# Patient Record
(demographics unavailable — no encounter records)

---

## 2024-10-23 NOTE — PAST MEDICAL HISTORY
[Menstruating] : menstruating [Menarche Age ____] : age at menarche was [unfilled] [Definite ___ (Date)] : the last menstrual period was [unfilled] [Normal Amount/Duration] : it was of a normal amount and duration [Regular Cycle Intervals] : have been regular [Total Preg ___] : G[unfilled] [Live Births ___] : P[unfilled]  [AB Spont ___] : miscarriages: [unfilled]

## 2024-10-24 NOTE — REVIEW OF SYSTEMS
[Wheezing] : wheezing [Negative] : Heme/Lymph [Fever] : no fever [Chills] : no chills [Fatigue] : no fatigue [Recent Change In Weight] : ~T no recent weight change [Chest Pain] : no chest pain [Palpitations] : no palpitations [Lower Ext Edema] : no lower extremity edema [Shortness Of Breath] : no shortness of breath [Cough] : no cough [Dyspnea on Exertion] : no dyspnea on exertion [Abdominal Pain] : no abdominal pain [Nausea] : no nausea [Constipation] : no constipation [Diarrhea] : diarrhea [Vomiting] : no vomiting [Heartburn] : no heartburn [Melena] : no melena [Dysuria] : no dysuria [Incontinence] : no incontinence [Nocturia] : no nocturia [Hematuria] : no hematuria [Joint Pain] : no joint pain [Joint Stiffness] : no joint stiffness [Joint Swelling] : no joint swelling [Muscle Pain] : no muscle pain [Back Pain] : no back pain [Itching] : no itching [Mole Changes] : no mole changes [Skin Rash] : no skin rash [Headache] : no headache [Dizziness] : no dizziness [Fainting] : no fainting [Unsteady Walking] : no ataxia [Insomnia] : no insomnia [Anxiety] : no anxiety [Depression] : no depression [Easy Bleeding] : no easy bleeding [Easy Bruising] : no easy bruising [Swollen Glands] : no swollen glands [FreeTextEntry3] : Wears corrective lenses, [FreeTextEntry9] : occ knee pain,

## 2024-10-24 NOTE — HISTORY OF PRESENT ILLNESS
[FreeTextEntry1] : Patient presented for the first time for transition of care, she's looking for a new PCP and requesting a PE.  Last PE was 2 years ago. [de-identified] : She feels well w/o any new complaint.  She has not been sick over the last couple of years.  She had COVID infection 2x in 2021. She recovered on her own.  She had 2 doses of COVID vaccine.  She declined Flu vaccine.

## 2024-10-24 NOTE — ASSESSMENT
[FreeTextEntry1] : Pt will f/u with GYN for Pap smear and breast imaging studies.  She was reminded to have routine eye exam and dental care.  She was given Rx to check routine labs.

## 2024-10-24 NOTE — HEALTH RISK ASSESSMENT
[Excellent] : ~his/her~  mood as  excellent [Yes] : Yes [Monthly or less (1 pt)] : Monthly or less (1 point) [1 or 2 (0 pts)] : 1 or 2 (0 points) [Never (0 pts)] : Never (0 points) [No] : In the past 12 months have you used drugs other than those required for medical reasons? No [No falls in past year] : Patient reported no falls in the past year [Little interest or pleasure doing things] : 1) Little interest or pleasure doing things [Feeling down, depressed, or hopeless] : 2) Feeling down, depressed, or hopeless [0] : 2) Feeling down, depressed, or hopeless: Not at all (0) [PHQ-2 Negative - No further assessment needed] : PHQ-2 Negative - No further assessment needed [None] : None [With Family] : lives with family [# of Members in Household ___] :  household currently consist of [unfilled] member(s) [Employed] : employed [College] : College [] :  [# Of Children ___] : has [unfilled] children [Feels Safe at Home] : Feels safe at home [Fully functional (bathing, dressing, toileting, transferring, walking, feeding)] : Fully functional (bathing, dressing, toileting, transferring, walking, feeding) [Fully functional (using the telephone, shopping, preparing meals, housekeeping, doing laundry, using] : Fully functional and needs no help or supervision to perform IADLs (using the telephone, shopping, preparing meals, housekeeping, doing laundry, using transportation, managing medications and managing finances) [Smoke Detector] : smoke detector [Seat Belt] :  uses seat belt [Never] : Never [NO] : No [Audit-CScore] : 0 [de-identified] : A lot of walking at work,  [[id number]] : 0 [EyeExamDate] : 09/24 [Change in mental status noted] : No change in mental status noted [Reports changes in hearing] : Reports no changes in hearing [Reports changes in vision] : Reports no changes in vision [Reports changes in dental health] : Reports no changes in dental health [MammogramDate] : 06/22 [MammogramComments] : US breast - 6/2022 [PapSmearDate] : 04/22 [de-identified] : dentist - 2023

## 2024-10-24 NOTE — DATA REVIEWED
[FreeTextEntry1] : EKG - NSR, R - 64, axis - 60, no STTW abnormality.  Prior EKG not available for comparison.

## 2024-10-24 NOTE — HEALTH RISK ASSESSMENT
[Excellent] : ~his/her~  mood as  excellent [Yes] : Yes [Monthly or less (1 pt)] : Monthly or less (1 point) [1 or 2 (0 pts)] : 1 or 2 (0 points) [Never (0 pts)] : Never (0 points) [No] : In the past 12 months have you used drugs other than those required for medical reasons? No [No falls in past year] : Patient reported no falls in the past year [Little interest or pleasure doing things] : 1) Little interest or pleasure doing things [Feeling down, depressed, or hopeless] : 2) Feeling down, depressed, or hopeless [0] : 2) Feeling down, depressed, or hopeless: Not at all (0) [PHQ-2 Negative - No further assessment needed] : PHQ-2 Negative - No further assessment needed [None] : None [With Family] : lives with family [# of Members in Household ___] :  household currently consist of [unfilled] member(s) [Employed] : employed [College] : College [] :  [# Of Children ___] : has [unfilled] children [Feels Safe at Home] : Feels safe at home [Fully functional (bathing, dressing, toileting, transferring, walking, feeding)] : Fully functional (bathing, dressing, toileting, transferring, walking, feeding) [Fully functional (using the telephone, shopping, preparing meals, housekeeping, doing laundry, using] : Fully functional and needs no help or supervision to perform IADLs (using the telephone, shopping, preparing meals, housekeeping, doing laundry, using transportation, managing medications and managing finances) [Smoke Detector] : smoke detector [Seat Belt] :  uses seat belt [Never] : Never [NO] : No [Audit-CScore] : 0 [de-identified] : A lot of walking at work,  [[id number]] : 0 [EyeExamDate] : 09/24 [Change in mental status noted] : No change in mental status noted [Reports changes in hearing] : Reports no changes in hearing [Reports changes in vision] : Reports no changes in vision [Reports changes in dental health] : Reports no changes in dental health [MammogramDate] : 06/22 [MammogramComments] : US breast - 6/2022 [PapSmearDate] : 04/22 [de-identified] : dentist - 2023

## 2024-10-24 NOTE — HISTORY OF PRESENT ILLNESS
[FreeTextEntry1] : Patient presented for the first time for transition of care, she's looking for a new PCP and requesting a PE.  Last PE was 2 years ago. [de-identified] : She feels well w/o any new complaint.  She has not been sick over the last couple of years.  She had COVID infection 2x in 2021. She recovered on her own.  She had 2 doses of COVID vaccine.  She declined Flu vaccine.

## 2024-10-24 NOTE — PHYSICAL EXAM
[No Acute Distress] : no acute distress [Well Nourished] : well nourished [Well Developed] : well developed [Normal Sclera/Conjunctiva] : normal sclera/conjunctiva [PERRL] : pupils equal round and reactive to light [EOMI] : extraocular movements intact [Normal Outer Ear/Nose] : the outer ears and nose were normal in appearance [Normal Oropharynx] : the oropharynx was normal [Normal TMs] : both tympanic membranes were normal [Normal Nasal Mucosa] : the nasal mucosa was normal [No JVD] : no jugular venous distention [No Lymphadenopathy] : no lymphadenopathy [Supple] : supple [No Respiratory Distress] : no respiratory distress  [Clear to Auscultation] : lungs were clear to auscultation bilaterally [Normal Rate] : normal rate  [Regular Rhythm] : with a regular rhythm [Normal S1, S2] : normal S1 and S2 [No Carotid Bruits] : no carotid bruits [Pedal Pulses Present] : the pedal pulses are present [No Edema] : there was no peripheral edema [No Extremity Clubbing/Cyanosis] : no extremity clubbing/cyanosis [Soft] : abdomen soft [Non Tender] : non-tender [Non-distended] : non-distended [Normal Bowel Sounds] : normal bowel sounds [Declined Rectal Exam] : declined rectal exam [Normal Supraclavicular Nodes] : no supraclavicular lymphadenopathy [Normal Axillary Nodes] : no axillary lymphadenopathy [Normal Posterior Cervical Nodes] : no posterior cervical lymphadenopathy [Normal Anterior Cervical Nodes] : no anterior cervical lymphadenopathy [No CVA Tenderness] : no CVA  tenderness [No Spinal Tenderness] : no spinal tenderness [No Joint Swelling] : no joint swelling [Grossly Normal Strength/Tone] : grossly normal strength/tone [No Rash] : no rash [Coordination Grossly Intact] : coordination grossly intact [No Focal Deficits] : no focal deficits [Normal Gait] : normal gait [Speech Grossly Normal] : speech grossly normal [Normal Affect] : the affect was normal [Alert and Oriented x3] : oriented to person, place, and time [Normal Mood] : the mood was normal [de-identified] : Obese female in stated age,  [FreeTextEntry1] : declined, pt is menstruating,

## 2024-10-24 NOTE — PHYSICAL EXAM
[No Acute Distress] : no acute distress [Well Nourished] : well nourished [Well Developed] : well developed [Normal Sclera/Conjunctiva] : normal sclera/conjunctiva [PERRL] : pupils equal round and reactive to light [EOMI] : extraocular movements intact [Normal Outer Ear/Nose] : the outer ears and nose were normal in appearance [Normal Oropharynx] : the oropharynx was normal [Normal TMs] : both tympanic membranes were normal [Normal Nasal Mucosa] : the nasal mucosa was normal [No JVD] : no jugular venous distention [No Lymphadenopathy] : no lymphadenopathy [Supple] : supple [No Respiratory Distress] : no respiratory distress  [Clear to Auscultation] : lungs were clear to auscultation bilaterally [Normal Rate] : normal rate  [Regular Rhythm] : with a regular rhythm [Normal S1, S2] : normal S1 and S2 [No Carotid Bruits] : no carotid bruits [Pedal Pulses Present] : the pedal pulses are present [No Edema] : there was no peripheral edema [No Extremity Clubbing/Cyanosis] : no extremity clubbing/cyanosis [Soft] : abdomen soft [Non Tender] : non-tender [Non-distended] : non-distended [Normal Bowel Sounds] : normal bowel sounds [Declined Rectal Exam] : declined rectal exam [Normal Supraclavicular Nodes] : no supraclavicular lymphadenopathy [Normal Axillary Nodes] : no axillary lymphadenopathy [Normal Posterior Cervical Nodes] : no posterior cervical lymphadenopathy [Normal Anterior Cervical Nodes] : no anterior cervical lymphadenopathy [No CVA Tenderness] : no CVA  tenderness [No Spinal Tenderness] : no spinal tenderness [No Joint Swelling] : no joint swelling [Grossly Normal Strength/Tone] : grossly normal strength/tone [No Rash] : no rash [Coordination Grossly Intact] : coordination grossly intact [No Focal Deficits] : no focal deficits [Normal Gait] : normal gait [Speech Grossly Normal] : speech grossly normal [Normal Affect] : the affect was normal [Alert and Oriented x3] : oriented to person, place, and time [Normal Mood] : the mood was normal [de-identified] : Obese female in stated age,  [FreeTextEntry1] : declined, pt is menstruating,

## 2025-04-17 NOTE — HISTORY OF PRESENT ILLNESS
[de-identified] : ROCKY is a 45 year old F who presents for initial visit for R knee pain onset 1 week ago (3/31/25) with acute worsening in last 2-3 days without injury or trauma. Pain is primarily located at the lateral knee and described as dull in nature 3/10 in intensity, worse with knee flexion/kneeling, better with rest/warm compress.  Patient does report some swelling at symptom onset with improving since onset. Also reports + locking/buckling of R knee w/o falls. Reports prior meniscal tear ~10 years ago and did not do any PT, surgery, or take any meds at that time with spontaneous pain resolution and has been pain-free since then until current presentation. Reports pain feels similar this time to prior injury and reports having an MRI 10 years ago and was told she had a torn meniscus at that time. Patient has not seen another physician for this episode and no new imaging.

## 2025-04-17 NOTE — HISTORY OF PRESENT ILLNESS
[de-identified] : ROCKY is a 45 year old F who presents for initial visit for R knee pain onset 1 week ago (3/31/25) with acute worsening in last 2-3 days without injury or trauma. Pain is primarily located at the lateral knee and described as dull in nature 3/10 in intensity, worse with knee flexion/kneeling, better with rest/warm compress.  Patient does report some swelling at symptom onset with improving since onset. Also reports + locking/buckling of R knee w/o falls. Reports prior meniscal tear ~10 years ago and did not do any PT, surgery, or take any meds at that time with spontaneous pain resolution and has been pain-free since then until current presentation. Reports pain feels similar this time to prior injury and reports having an MRI 10 years ago and was told she had a torn meniscus at that time. Patient has not seen another physician for this episode and no new imaging.

## 2025-04-17 NOTE — HISTORY OF PRESENT ILLNESS
[de-identified] : ROCKY is a 45 year old F who presents for initial visit for R knee pain onset 1 week ago (3/31/25) with acute worsening in last 2-3 days without injury or trauma. Pain is primarily located at the lateral knee and described as dull in nature 3/10 in intensity, worse with knee flexion/kneeling, better with rest/warm compress.  Patient does report some swelling at symptom onset with improving since onset. Also reports + locking/buckling of R knee w/o falls. Reports prior meniscal tear ~10 years ago and did not do any PT, surgery, or take any meds at that time with spontaneous pain resolution and has been pain-free since then until current presentation. Reports pain feels similar this time to prior injury and reports having an MRI 10 years ago and was told she had a torn meniscus at that time. Patient has not seen another physician for this episode and no new imaging.

## 2025-04-17 NOTE — END OF VISIT
[] : Resident [FreeTextEntry3] : Gisela Bender MD, [facility] Department of Orthopedics

## 2025-04-17 NOTE — PHYSICAL EXAM
[de-identified] : Constitutional: Well-nourished, well-developed, No acute distress Respiratory: Good respiratory effort, no SOB Lymphatic: No regional lymphadenopathy, no lymphedema Psychiatric: Pleasant and normal affect, alert and oriented x3 Skin: Clean dry and intact B/L UE/LE Musculoskeletal: normal except where as noted in regional exam   R Knee: APPEARANCE: Mild swelling of anterior inferolateral and superolateral R knee. POSITIVE TENDERNESS: + MIld TTP along lateral femoral epicondyle and patella. Knee ROM: full & painless. RESISTIVE TESTING: painless resisted knee flex/ext with 5/5 strength.  SPECIAL TESTS: stable v/v stress. + mild pain elicited with passive movement of patella medially and laterally with knee extended position at rest.  Lachman's did not reproduce pain and an endpoint was appreciated. Randy's did not reproduce pain. Thessaly test with mild reproduction of pain at medial knee.  NEURO: Normal sensation of LEs, DTRs 2+/4 in patella and achilles bl B/L Hips: No asymmetry, malalignment, or swelling, Full ROM, 5/5 strength in flexion B/L Ankles: No asymmetry, malalignment, or swelling, Full ROM, 5/5 strength in DF/PF   [de-identified] : The following radiographs were ordered and read by me during this patient's visit. I reviewed each radiograph in detail with the patient and discussed the findings as highlighted below.  4 view XR of the R knee obtained today that shows Medial compartment joint space narrowing.

## 2025-04-17 NOTE — PHYSICAL EXAM
[de-identified] : Constitutional: Well-nourished, well-developed, No acute distress Respiratory: Good respiratory effort, no SOB Lymphatic: No regional lymphadenopathy, no lymphedema Psychiatric: Pleasant and normal affect, alert and oriented x3 Skin: Clean dry and intact B/L UE/LE Musculoskeletal: normal except where as noted in regional exam   R Knee: APPEARANCE: Mild swelling of anterior inferolateral and superolateral R knee. POSITIVE TENDERNESS: + MIld TTP along lateral femoral epicondyle and patella. Knee ROM: full & painless. RESISTIVE TESTING: painless resisted knee flex/ext with 5/5 strength.  SPECIAL TESTS: stable v/v stress. + mild pain elicited with passive movement of patella medially and laterally with knee extended position at rest.  Lachman's did not reproduce pain and an endpoint was appreciated. Randy's did not reproduce pain. Thessaly test with mild reproduction of pain at medial knee.  NEURO: Normal sensation of LEs, DTRs 2+/4 in patella and achilles bl B/L Hips: No asymmetry, malalignment, or swelling, Full ROM, 5/5 strength in flexion B/L Ankles: No asymmetry, malalignment, or swelling, Full ROM, 5/5 strength in DF/PF   [de-identified] : The following radiographs were ordered and read by me during this patient's visit. I reviewed each radiograph in detail with the patient and discussed the findings as highlighted below.  4 view XR of the R knee obtained today that shows Medial compartment joint space narrowing.

## 2025-04-17 NOTE — ASSESSMENT
[FreeTextEntry1] :  45 yr old F here with acute R knee pain, likely exacerbation from chronic meniscal tear ~10 yrs prior.   Discussed findings of today's exam, imaging, and possible causes of patient's pain. Educated patient on their most probable diagnosis R knee meniscal tear exacerbation. Reviewed possible courses of treatment, and we collaboratively decided the best course of treatment at this time will include:  1. R knee MRI ordered 2. Patient declined any medications, aspiration of knee, or PT pending MRI results 3. Follow up in office when MRI Knee completed for further discussion of treatment options  Gisela Bender MD, [facility] Department of Orthopedics

## 2025-04-17 NOTE — PHYSICAL EXAM
[de-identified] : Constitutional: Well-nourished, well-developed, No acute distress Respiratory: Good respiratory effort, no SOB Lymphatic: No regional lymphadenopathy, no lymphedema Psychiatric: Pleasant and normal affect, alert and oriented x3 Skin: Clean dry and intact B/L UE/LE Musculoskeletal: normal except where as noted in regional exam   R Knee: APPEARANCE: Mild swelling of anterior inferolateral and superolateral R knee. POSITIVE TENDERNESS: + MIld TTP along lateral femoral epicondyle and patella. Knee ROM: full & painless. RESISTIVE TESTING: painless resisted knee flex/ext with 5/5 strength.  SPECIAL TESTS: stable v/v stress. + mild pain elicited with passive movement of patella medially and laterally with knee extended position at rest.  Lachman's did not reproduce pain and an endpoint was appreciated. Randy's did not reproduce pain. Thessaly test with mild reproduction of pain at medial knee.  NEURO: Normal sensation of LEs, DTRs 2+/4 in patella and achilles bl B/L Hips: No asymmetry, malalignment, or swelling, Full ROM, 5/5 strength in flexion B/L Ankles: No asymmetry, malalignment, or swelling, Full ROM, 5/5 strength in DF/PF   [de-identified] : The following radiographs were ordered and read by me during this patient's visit. I reviewed each radiograph in detail with the patient and discussed the findings as highlighted below.  4 view XR of the R knee obtained today that shows Medial compartment joint space narrowing.

## 2025-04-17 NOTE — END OF VISIT
[facility]  [] : Resident [FreeTextEntry3] : Gisela Bender MD, [facility] Orthopedics